# Patient Record
Sex: FEMALE | Race: ASIAN | Employment: UNEMPLOYED | URBAN - METROPOLITAN AREA
[De-identification: names, ages, dates, MRNs, and addresses within clinical notes are randomized per-mention and may not be internally consistent; named-entity substitution may affect disease eponyms.]

---

## 2024-06-04 ENCOUNTER — APPOINTMENT (OUTPATIENT)
Dept: CT IMAGING | Facility: HOSPITAL | Age: 69
End: 2024-06-04
Attending: EMERGENCY MEDICINE

## 2024-06-04 ENCOUNTER — HOSPITAL ENCOUNTER (EMERGENCY)
Facility: HOSPITAL | Age: 69
Discharge: LEFT BEFORE DISCHARGE | End: 2024-06-04
Attending: EMERGENCY MEDICINE

## 2024-06-04 VITALS
BODY MASS INDEX: 28 KG/M2 | SYSTOLIC BLOOD PRESSURE: 149 MMHG | HEIGHT: 59.06 IN | TEMPERATURE: 98 F | DIASTOLIC BLOOD PRESSURE: 63 MMHG | WEIGHT: 138.88 LBS | OXYGEN SATURATION: 98 % | HEART RATE: 67 BPM | RESPIRATION RATE: 18 BRPM
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
## 2024-06-04 DIAGNOSIS — Z53.29 LEFT AGAINST MEDICAL ADVICE: ICD-10-CM

## 2024-06-04 DIAGNOSIS — S00.83XA CONTUSION OF FACE, INITIAL ENCOUNTER: ICD-10-CM

## 2024-06-04 LAB
ALBUMIN SERPL-MCNC: 3.6 G/DL (ref 3.4–5)
ALBUMIN/GLOB SERPL: 0.8 {RATIO} (ref 1–2)
ALP LIVER SERPL-CCNC: 98 U/L
ALT SERPL-CCNC: 21 U/L
ANION GAP SERPL CALC-SCNC: 7 MMOL/L (ref 0–18)
AST SERPL-CCNC: 21 U/L (ref 15–37)
BASOPHILS # BLD AUTO: 0.05 X10(3) UL (ref 0–0.2)
BASOPHILS NFR BLD AUTO: 0.6 %
BILIRUB SERPL-MCNC: 0.3 MG/DL (ref 0.1–2)
BUN BLD-MCNC: 10 MG/DL (ref 9–23)
CALCIUM BLD-MCNC: 9 MG/DL (ref 8.5–10.1)
CHLORIDE SERPL-SCNC: 110 MMOL/L (ref 98–112)
CO2 SERPL-SCNC: 24 MMOL/L (ref 21–32)
CREAT BLD-MCNC: 1.04 MG/DL
D DIMER PPP FEU-MCNC: <0.27 UG/ML FEU (ref ?–0.68)
EGFRCR SERPLBLD CKD-EPI 2021: 59 ML/MIN/1.73M2 (ref 60–?)
EOSINOPHIL # BLD AUTO: 0.15 X10(3) UL (ref 0–0.7)
EOSINOPHIL NFR BLD AUTO: 1.9 %
ERYTHROCYTE [DISTWIDTH] IN BLOOD BY AUTOMATED COUNT: 12.5 %
GLOBULIN PLAS-MCNC: 4.4 G/DL (ref 2.8–4.4)
GLUCOSE BLD-MCNC: 105 MG/DL (ref 70–99)
HCT VFR BLD AUTO: 40.4 %
HGB BLD-MCNC: 13.6 G/DL
IMM GRANULOCYTES # BLD AUTO: 0.02 X10(3) UL (ref 0–1)
IMM GRANULOCYTES NFR BLD: 0.3 %
LYMPHOCYTES # BLD AUTO: 2.48 X10(3) UL (ref 1–4)
LYMPHOCYTES NFR BLD AUTO: 31.2 %
MCH RBC QN AUTO: 29.4 PG (ref 26–34)
MCHC RBC AUTO-ENTMCNC: 33.7 G/DL (ref 31–37)
MCV RBC AUTO: 87.3 FL
MONOCYTES # BLD AUTO: 0.54 X10(3) UL (ref 0.1–1)
MONOCYTES NFR BLD AUTO: 6.8 %
NEUTROPHILS # BLD AUTO: 4.72 X10 (3) UL (ref 1.5–7.7)
NEUTROPHILS # BLD AUTO: 4.72 X10(3) UL (ref 1.5–7.7)
NEUTROPHILS NFR BLD AUTO: 59.2 %
OSMOLALITY SERPL CALC.SUM OF ELEC: 291 MOSM/KG (ref 275–295)
PLATELET # BLD AUTO: 294 10(3)UL (ref 150–450)
POTASSIUM SERPL-SCNC: 3.7 MMOL/L (ref 3.5–5.1)
PROT SERPL-MCNC: 8 G/DL (ref 6.4–8.2)
RBC # BLD AUTO: 4.63 X10(6)UL
SODIUM SERPL-SCNC: 141 MMOL/L (ref 136–145)
TROPONIN I SERPL HS-MCNC: 4 NG/L
WBC # BLD AUTO: 8 X10(3) UL (ref 4–11)

## 2024-06-04 PROCEDURE — 99284 EMERGENCY DEPT VISIT MOD MDM: CPT

## 2024-06-04 PROCEDURE — 84484 ASSAY OF TROPONIN QUANT: CPT | Performed by: EMERGENCY MEDICINE

## 2024-06-04 PROCEDURE — 85025 COMPLETE CBC W/AUTO DIFF WBC: CPT | Performed by: EMERGENCY MEDICINE

## 2024-06-04 PROCEDURE — 93005 ELECTROCARDIOGRAM TRACING: CPT

## 2024-06-04 PROCEDURE — 85379 FIBRIN DEGRADATION QUANT: CPT | Performed by: EMERGENCY MEDICINE

## 2024-06-04 PROCEDURE — 80053 COMPREHEN METABOLIC PANEL: CPT | Performed by: EMERGENCY MEDICINE

## 2024-06-04 PROCEDURE — 93010 ELECTROCARDIOGRAM REPORT: CPT

## 2024-06-04 PROCEDURE — 36415 COLL VENOUS BLD VENIPUNCTURE: CPT

## 2024-06-05 LAB
ATRIAL RATE: 73 BPM
P AXIS: 34 DEGREES
P-R INTERVAL: 158 MS
Q-T INTERVAL: 414 MS
QRS DURATION: 66 MS
QTC CALCULATION (BEZET): 456 MS
R AXIS: 16 DEGREES
T AXIS: 147 DEGREES
VENTRICULAR RATE: 73 BPM

## 2024-06-05 NOTE — ED PROVIDER NOTES
Patient Seen in: [facility] Emergency Department      History     Chief Complaint   Patient presents with    Fall     Stated Complaint: fall, on thinners    Subjective:   HPI    Patient is a 69-year-old female presenting to the ED for evaluation after a fall.  The patient cannot recall why she fell however witnesses as well as the patient deny any syncopal event or loss of consciousness.  She was able to fall to her knees and catch herself with her left arm.  She did slightly strike her face and head.  No complaints of headache but does have some slight soreness to the face especially over the nose.  No epistaxis.  No neck pain.  No weakness or loss of sensation.  No lightheadedness or dizziness in the ED.  However she did feel slightly lightheaded while she was walking.  Patient did recently travel from [place] approximately 6-hour flight.  No chest pain or shortness of breath.  This time she is mostly asymptomatic except for soreness.  Patient had reported that she was on a blood thinner.  Her medications are from [place].  When family googles the medication it appears that is the equivalent of an aspirin.  She also takes medication for hypertension.  No lower extremity edema.  No change in oral intake.    Objective:   Past Medical History:    Essential hypertension    Hyperlipidemia              Past Surgical History:   Procedure Laterality Date    Appendectomy      Hc  section level i      Removal of ovarian cyst(s)                  Social History     Socioeconomic History    Marital status:    Tobacco Use    Smoking status: Never    Smokeless tobacco: Never   Substance and Sexual Activity    Alcohol use: Never    Drug use: Never              Review of Systems    Positive for stated complaint: fall, on thinners  Other systems are as noted in HPI.  Constitutional and vital signs reviewed.      All other systems reviewed and negative except as noted above.    Physical Exam     ED Triage  Vitals [06/04/24 2150]   BP (!) 191/94   Pulse 75   Resp 16   Temp 97.9 °F (36.6 °C)   Temp src    SpO2 97 %   O2 Device None (Room air)       Current Vitals:   Vital Signs  BP: 149/63  Pulse: 67  Resp: 18  Temp: 97.9 °F (36.6 °C)  MAP (mmHg): 88    Oxygen Therapy  SpO2: 98 %  O2 Device: None (Room air)            Physical Exam  Vitals and nursing note reviewed.   Constitutional:       General: She is not in acute distress.     Appearance: Normal appearance. She is well-developed. She is not ill-appearing or toxic-appearing.   HENT:      Head: Normocephalic.        Right Ear: External ear normal.      Left Ear: External ear normal.      Mouth/Throat:      Mouth: Mucous membranes are moist.      Pharynx: Oropharynx is clear.   Eyes:      Conjunctiva/sclera: Conjunctivae normal.   Neck:      Comments: No midline cervical spine tenderness.    Cardiovascular:      Rate and Rhythm: Normal rate and regular rhythm.      Heart sounds: Normal heart sounds.   Pulmonary:      Effort: Pulmonary effort is normal.      Breath sounds: Normal breath sounds.   Abdominal:      General: Abdomen is flat. Bowel sounds are normal. There is no distension.      Tenderness: There is no abdominal tenderness.   Musculoskeletal:      Cervical back: Normal range of motion and neck supple. No rigidity or tenderness.      Right lower leg: No edema.      Left lower leg: No edema.   Skin:     General: Skin is warm.      Capillary Refill: Capillary refill takes less than 2 seconds.      Findings: No rash.   Neurological:      Mental Status: She is alert.   Psychiatric:         Mood and Affect: Mood normal.         Behavior: Behavior normal.               ED Course     Labs Reviewed   COMP METABOLIC PANEL (14) - Abnormal; Notable for the following components:       Result Value    Glucose 105 (*)     Creatinine 1.04 (*)     eGFR-Cr 59 (*)     A/G Ratio 0.8 (*)     All other components within normal limits   TROPONIN I HIGH SENSITIVITY - Normal    D-DIMER - Normal   CBC WITH DIFFERENTIAL WITH PLATELET    Narrative:     The following orders were created for panel order CBC With Differential With Platelet.  Procedure                               Abnormality         Status                     ---------                               -----------         ------                     CBC W/ DIFFERENTIAL[[id number]]                              Final result                 Please view results for these tests on the individual orders.   RAINBOW DRAW LAVENDER   RAINBOW DRAW LIGHT GREEN   RAINBOW DRAW BLUE   CBC W/ DIFFERENTIAL     EKG    Rate, intervals and axes as noted on EKG Report.  Rate: 73  Rhythm: Sinus Rhythm  Reading: LVH.  No prior EKG available for comparison.               ED Course as of 06/04/24 2316  ------------------------------------------------------------  Time: 06/04 2306  Comment: Orthostatic vital signs are negative.              MDM      History obtained from .     Differential diagnosis includes intracranial hemorrhage, facial fracture vs contusion, orthostatic hypotension, atypical presentation for ACS, PE. They deny any syncope or LOC but sounds like patient had near syncopal event with fall give lightheadedness.      Previous records - none available for review.  Patient is from [place].     Testing considered and ordered includes EKG, CBC, CMP, troponin, d-dimer. CT brain and facial bones.  Orthostatic vital signs.    I reviewed all results.  CBC and CMP reviewed. GFR 59, no prior for comparison.  D-dimer is negative.  Troponin is negative.  Orthostatic vital signs are negative.    PATIENT REFUSED CT ANA AND FACIAL BONES.  Did not require any additional treatment in the ED.  Given refusal of testing with associated head injury and reported \"blood thinner\" which is from [place] patient will need to get vise assuming all risks including declining condition, permanent disability, and/or death.  Agreeable to return to ED if any symptoms  worsen, persist, or new symptoms develop.  May follow-up outpatient for reevaluation as well.                                       Medical Decision Making      Disposition and Plan     Clinical Impression:  1. Fall, initial encounter    2. Contusion of face, initial encounter    3. Left against medical advice         Disposition:  [facility]  6/4/2024 11:16 pm    Follow-up:  Jake Milner MD  [address] [address]  [phone]    Schedule an appointment as soon as possible for a visit      [facility] Emergency Department  [address] [place] [id number]  [phone]  Follow up  IF SYMPTOMS WORSEN, PERSIST, OR NEW SYMPTOMS DEVEL          Medications Prescribed:  There are no discharge medications for this patient.

## 2024-06-05 NOTE — ED INITIAL ASSESSMENT (HPI)
Patient reports she was walking around 7pm and became lightheaded with blurred vision and fell hitting her left knee and left shoulder. Patient reports no longer feeling lightheaded. Family reports she did not lose loc. States she is on a blood thinner

## 2024-06-05 NOTE — ED QUICK NOTES
Home meds include Loprin (from [place] - antiplatelet medication 75 mg) once per day.    Exforge (BP med from [place] includes amlodipine and valsartan per son) 1-2 times per day depending on readings.    Rovista (rosuvastatin active ingredient) once per day.    Nitroglycerin in some form, 2.6 mg per patient once per day.     Unable to add meds into [facility] due to being from out of country.

## 2024-06-05 NOTE — ED QUICK NOTES
Patient and family verbalize concern over CT scans not being covered under travel insurance. RN asked registration to discuss with patient and family to assist with understanding of their insurance. Registration assisted patient and family to review insurance and they decided to continue refusal of CT despite emergency coverage. MD notified. Risks of CT refusal and incomplete workup explained in depth, against medical advice discharge process explained. Patient and son at bedside verbalize teach back understanding of leaving against medical advice and the risks it entails. They prefer to continue with AMA discharge.

## (undated) NOTE — LETTER
Date & Time: 6/4/2024, 11:30 PM  Patient: Humera Mahmood  Encounter Provider(s):    Nanci London, DO         This certifies that I, Humera Mahmood, a patient at an [facility], am leaving the facility voluntarily and against the advice of my physician.    I acknowledge that I have been:    1. informed that my physician believes that I need to receive care here;  2. informed that if I leave, I could become sicker or even die; and  3. provided discharge instructions consistent with my current diagnosis.    I hereby release my physician, the facility, and its employees from all responsibility for any ill effects which may result from this action.        __________________________________  Patient or authorized caregiver signature    __________________________________  RN signature    If no patient or patient representative signature was obtained, sign below to acknowledge that the form was reviewed with the patient and that the patient refused to sign.    __________________________________  RN signature